# Patient Record
Sex: MALE | Race: WHITE | NOT HISPANIC OR LATINO | Employment: OTHER | URBAN - METROPOLITAN AREA
[De-identification: names, ages, dates, MRNs, and addresses within clinical notes are randomized per-mention and may not be internally consistent; named-entity substitution may affect disease eponyms.]

---

## 2020-11-30 NOTE — PROGRESS NOTES
"Subjective:       Patient ID: Ozzy Henson is a 70 y.o. male.    Chief Complaint:  Has a history of having nasal and eye allergies since childhood. Had been on SCITfor 30 plus years with excellent results. SCIT WAS DICONTINUED IN MAY 2020.    Used to get recurrent sinusitis nd bronchitis. Those are much improved after getting two PPSV 23 and two PCV 13 vaccines.        HPI :     Has been having bilateral parotitis, which is stable. Has PND and dry mouth.  Years ago had DVT left calf with history of pulmonary embolism and RML collapse.  Had been following up with PCP MD REGINALD Barcenas MD and pulmonologist Dr. Hollis Case. ' recent angiogram was normal "  Has right hip, knee , and legs pain and cramps'.      Outpatient Medications Marked as Taking for the 12/2/20 encounter (Office Visit) with Lenin Goetz MD   Medication Sig Dispense Refill    amLODIPine (NORVASC) 5 MG tablet       ascorbic acid, vitamin C, (VITAMIN C) 1000 MG tablet Take 1,000 mg by mouth once daily.      aspirin 325 MG tablet Take 325 mg by mouth once daily.      atorvastatin (LIPITOR) 20 MG tablet       fluticasone/vilanterol (BREO ELLIPTA INHL) Inhale into the lungs once as needed.      losartan (COZAAR) 100 MG tablet       pantoprazole (PROTONIX) 40 MG tablet       tamsulosin (FLOMAX) 0.4 mg Cap       vitamin D (VITAMIN D3) 1000 units Tab Take 1,000 Units by mouth once daily.          Patient has no known allergies.     History reviewed. No pertinent past medical history.    Family History   Problem Relation Age of Onset    Allergies Sister        Environmental History: Dust Mite Controls: Dust mite controls are already in place.   The patient is well versed with environmental control measures    Review of Systems   Constitutional: Negative.  Negative for fatigue and fever.   HENT: Positive for congestion. Negative for ear pain, postnasal drip, rhinorrhea, sinus pressure, sinus pain, sneezing and sore throat.    Eyes: " "Negative.  Negative for redness and itching.   Respiratory: Negative.  Negative for cough, chest tightness, shortness of breath and wheezing.    Cardiovascular: Negative.  Negative for chest pain.   Gastrointestinal: Negative.  Negative for nausea.   Endocrine: Negative.  Negative for cold intolerance.   Genitourinary: Negative.  Negative for frequency.   Musculoskeletal: Positive for joint swelling. Negative for myalgias.        Right hip, knee and calf pain and severe cramps right leg and thigh   Skin: Negative for rash.   Allergic/Immunologic: Negative.  Negative for environmental allergies, food allergies and immunocompromised state.   Neurological: Negative.  Negative for dizziness and headaches.   Hematological: Negative.  Negative for adenopathy.   Psychiatric/Behavioral: Negative.  Negative for sleep disturbance.       Objective:     Visit Vitals  /86   Pulse 79   Temp 97.9 °F (36.6 °C)   Ht 5' 9" (1.753 m)   Wt 97.3 kg (214 lb 8.1 oz)   BMI 31.68 kg/m²       Physical Exam  Constitutional:       Appearance: Normal appearance. He is normal weight.   HENT:      Head: Normocephalic and atraumatic.      Right Ear: Tympanic membrane and external ear normal.      Left Ear: Tympanic membrane, ear canal and external ear normal.      Nose: Congestion present.      Mouth/Throat:      Mouth: Mucous membranes are moist.      Pharynx: Oropharynx is clear.   Eyes:      Extraocular Movements: Extraocular movements intact.      Conjunctiva/sclera: Conjunctivae normal.      Pupils: Pupils are equal, round, and reactive to light.   Neck:      Musculoskeletal: Neck supple.   Cardiovascular:      Rate and Rhythm: Normal rate and regular rhythm.      Heart sounds: Normal heart sounds.   Pulmonary:      Effort: Pulmonary effort is normal.      Breath sounds: Normal breath sounds.   Abdominal:      General: Abdomen is flat. Bowel sounds are normal.      Palpations: Abdomen is soft.   Musculoskeletal: Normal range of motion. "   Skin:     General: Skin is warm and dry.      Capillary Refill: Capillary refill takes more than 3 seconds.   Neurological:      General: No focal deficit present.      Mental Status: He is alert and oriented to person, place, and time. Mental status is at baseline.   Psychiatric:         Mood and Affect: Mood normal.         Behavior: Behavior normal.         Thought Content: Thought content normal.         Judgment: Judgment normal.           Assessment:      1. Sleep apnea with use of continuous positive airway pressure (CPAP)    2. Recurrent sinusitis    3. Non-seasonal allergic rhinitis due to pollen    4. Bronchitis    5. Chronic parotitis    6. Gastroesophageal reflux disease without esophagitis     7      History of left calf deep vein thrombosis with PE and collapse of the  RML in the past  8       Been on SCIT for 30 plus years, which was discontinued in May 2020  Plan:    TREAT ALL INFECTIONS. HAD RECEIVED PPSV-23 ( 10. 05.2006 and in October 2020 from Dr. Barcenas ) ) and second PCV 13  ( fall of 2018 ) vaccines.  Had been on SCIT over 30 plus years, off and on, with excellent results. Stopped SCIT in May 2020.  Re emphasized environmental control measures.  Did not recommend satrting back on SCIT  Zyrtec 10 mg qd  Flonase 50 mcg 2 sprays per nares qd.  CPAP for CAITLYN  Protonix 40 mg half hour before supper.  Had two doses of Shingrix.  Annual influenza vaccination, 8341-6365.  Follow up in six months.  dID NOT RE START ON scit  Breo Ellipta 100\ 25 one puff daily to treat bronchitis.  Take CORONAVIRUS VACCINE IN 4105-1471.  Follow up in July 2021

## 2020-12-02 ENCOUNTER — OFFICE VISIT (OUTPATIENT)
Dept: ALLERGY | Facility: CLINIC | Age: 70
End: 2020-12-02
Payer: MEDICARE

## 2020-12-02 VITALS
HEART RATE: 79 BPM | TEMPERATURE: 98 F | BODY MASS INDEX: 31.77 KG/M2 | DIASTOLIC BLOOD PRESSURE: 86 MMHG | HEIGHT: 69 IN | SYSTOLIC BLOOD PRESSURE: 137 MMHG | WEIGHT: 214.5 LBS

## 2020-12-02 DIAGNOSIS — K11.23 CHRONIC PAROTITIS: ICD-10-CM

## 2020-12-02 DIAGNOSIS — K21.9 GASTROESOPHAGEAL REFLUX DISEASE WITHOUT ESOPHAGITIS: ICD-10-CM

## 2020-12-02 DIAGNOSIS — G47.30 SLEEP APNEA WITH USE OF CONTINUOUS POSITIVE AIRWAY PRESSURE (CPAP): Primary | ICD-10-CM

## 2020-12-02 DIAGNOSIS — J40 BRONCHITIS: ICD-10-CM

## 2020-12-02 DIAGNOSIS — J30.1 NON-SEASONAL ALLERGIC RHINITIS DUE TO POLLEN: ICD-10-CM

## 2020-12-02 DIAGNOSIS — J32.9 RECURRENT SINUSITIS: ICD-10-CM

## 2020-12-02 PROCEDURE — 99999 PR PBB SHADOW E&M-NEW PATIENT-LVL III: ICD-10-PCS | Mod: PBBFAC,,, | Performed by: SPECIALIST

## 2020-12-02 PROCEDURE — 99999 PR PBB SHADOW E&M-NEW PATIENT-LVL III: CPT | Mod: PBBFAC,,, | Performed by: SPECIALIST

## 2020-12-02 PROCEDURE — 99213 OFFICE O/P EST LOW 20 MIN: CPT | Mod: S$PBB,,, | Performed by: SPECIALIST

## 2020-12-02 PROCEDURE — 99213 PR OFFICE/OUTPT VISIT, EST, LEVL III, 20-29 MIN: ICD-10-PCS | Mod: S$PBB,,, | Performed by: SPECIALIST

## 2020-12-02 PROCEDURE — 99203 OFFICE O/P NEW LOW 30 MIN: CPT | Mod: PBBFAC | Performed by: SPECIALIST

## 2020-12-02 RX ORDER — ASPIRIN 325 MG
325 TABLET ORAL DAILY
COMMUNITY

## 2020-12-02 RX ORDER — ATORVASTATIN CALCIUM 20 MG/1
TABLET, FILM COATED ORAL
COMMUNITY
Start: 2020-12-01

## 2020-12-02 RX ORDER — CHOLECALCIFEROL (VITAMIN D3) 25 MCG
1000 TABLET ORAL DAILY
COMMUNITY

## 2020-12-02 RX ORDER — LOSARTAN POTASSIUM 100 MG/1
TABLET ORAL
COMMUNITY
Start: 2020-12-01

## 2020-12-02 RX ORDER — AMLODIPINE BESYLATE 5 MG/1
TABLET ORAL
COMMUNITY
Start: 2020-12-01

## 2020-12-02 RX ORDER — TAMSULOSIN HYDROCHLORIDE 0.4 MG/1
CAPSULE ORAL
COMMUNITY
Start: 2020-12-01

## 2020-12-02 RX ORDER — IBUPROFEN 100 MG/5ML
1000 SUSPENSION, ORAL (FINAL DOSE FORM) ORAL DAILY
COMMUNITY

## 2020-12-02 RX ORDER — PANTOPRAZOLE SODIUM 40 MG/1
TABLET, DELAYED RELEASE ORAL
COMMUNITY
Start: 2020-12-01

## 2021-01-20 ENCOUNTER — IMMUNIZATION (OUTPATIENT)
Dept: INTERNAL MEDICINE | Facility: CLINIC | Age: 71
End: 2021-01-20
Payer: COMMERCIAL

## 2021-01-20 DIAGNOSIS — Z23 NEED FOR VACCINATION: Primary | ICD-10-CM

## 2021-01-20 PROCEDURE — 91300 COVID-19, MRNA, LNP-S, PF, 30 MCG/0.3 ML DOSE VACCINE: CPT | Mod: PBBFAC | Performed by: FAMILY MEDICINE

## 2021-02-10 ENCOUNTER — IMMUNIZATION (OUTPATIENT)
Dept: INTERNAL MEDICINE | Facility: CLINIC | Age: 71
End: 2021-02-10
Payer: COMMERCIAL

## 2021-02-10 DIAGNOSIS — Z23 NEED FOR VACCINATION: Primary | ICD-10-CM

## 2021-02-10 PROCEDURE — 0002A COVID-19, MRNA, LNP-S, PF, 30 MCG/0.3 ML DOSE VACCINE: CPT | Mod: PBBFAC | Performed by: FAMILY MEDICINE

## 2021-02-10 PROCEDURE — 91300 COVID-19, MRNA, LNP-S, PF, 30 MCG/0.3 ML DOSE VACCINE: CPT | Mod: PBBFAC | Performed by: FAMILY MEDICINE

## 2021-07-08 ENCOUNTER — OFFICE VISIT (OUTPATIENT)
Dept: ALLERGY | Facility: CLINIC | Age: 71
End: 2021-07-08
Payer: MEDICARE

## 2021-07-08 VITALS
DIASTOLIC BLOOD PRESSURE: 76 MMHG | WEIGHT: 209.19 LBS | BODY MASS INDEX: 30.98 KG/M2 | HEART RATE: 85 BPM | TEMPERATURE: 98 F | SYSTOLIC BLOOD PRESSURE: 120 MMHG | HEIGHT: 69 IN

## 2021-07-08 DIAGNOSIS — J40 BRONCHITIS: ICD-10-CM

## 2021-07-08 DIAGNOSIS — G47.33 OSA (OBSTRUCTIVE SLEEP APNEA): ICD-10-CM

## 2021-07-08 DIAGNOSIS — J30.89 PERENNIAL ALLERGIC RHINITIS: ICD-10-CM

## 2021-07-08 DIAGNOSIS — K21.9 GASTROESOPHAGEAL REFLUX DISEASE WITHOUT ESOPHAGITIS: ICD-10-CM

## 2021-07-08 DIAGNOSIS — K11.23 CHRONIC PAROTITIS: ICD-10-CM

## 2021-07-08 DIAGNOSIS — J32.9 RECURRENT SINUSITIS: Primary | ICD-10-CM

## 2021-07-08 PROCEDURE — 99214 PR OFFICE/OUTPT VISIT, EST, LEVL IV, 30-39 MIN: ICD-10-PCS | Mod: S$PBB,,, | Performed by: SPECIALIST

## 2021-07-08 PROCEDURE — 99213 OFFICE O/P EST LOW 20 MIN: CPT | Mod: PBBFAC | Performed by: SPECIALIST

## 2021-07-08 PROCEDURE — 99999 PR PBB SHADOW E&M-EST. PATIENT-LVL III: ICD-10-PCS | Mod: PBBFAC,,, | Performed by: SPECIALIST

## 2021-07-08 PROCEDURE — 99214 OFFICE O/P EST MOD 30 MIN: CPT | Mod: S$PBB,,, | Performed by: SPECIALIST

## 2021-07-08 PROCEDURE — 99999 PR PBB SHADOW E&M-EST. PATIENT-LVL III: CPT | Mod: PBBFAC,,, | Performed by: SPECIALIST

## 2021-07-08 RX ORDER — MONTELUKAST SODIUM 10 MG/1
10 TABLET ORAL NIGHTLY
Qty: 30 TABLET | Refills: 6 | Status: SHIPPED | OUTPATIENT
Start: 2021-07-08 | End: 2021-08-07

## 2021-07-08 RX ORDER — AZELASTINE 1 MG/ML
2 SPRAY, METERED NASAL 2 TIMES DAILY
Qty: 30 ML | Refills: 6 | Status: SHIPPED | OUTPATIENT
Start: 2021-07-08 | End: 2021-08-07

## 2022-01-14 ENCOUNTER — TELEPHONE (OUTPATIENT)
Dept: ALLERGY | Facility: CLINIC | Age: 72
End: 2022-01-14
Payer: COMMERCIAL

## 2022-01-14 NOTE — TELEPHONE ENCOUNTER
----- Message from Diana Arevalo sent at 1/14/2022 10:09 AM CST -----  Contact: Ozzy  Patient is calling to speak with the nurse regarding rescheduling the 1/26/22 appointment. Reports having a major surgery and is requesting a call back when possible. Pleas give patient a call back at [phone] as requested.  Thanks,  RP

## 2022-03-03 ENCOUNTER — OFFICE VISIT (OUTPATIENT)
Dept: ALLERGY | Facility: CLINIC | Age: 72
End: 2022-03-03
Payer: MEDICARE

## 2022-03-03 VITALS
HEIGHT: 69 IN | TEMPERATURE: 98 F | DIASTOLIC BLOOD PRESSURE: 87 MMHG | BODY MASS INDEX: 30.1 KG/M2 | SYSTOLIC BLOOD PRESSURE: 134 MMHG | WEIGHT: 203.25 LBS | HEART RATE: 89 BPM

## 2022-03-03 DIAGNOSIS — J30.89 PERENNIAL ALLERGIC RHINITIS: ICD-10-CM

## 2022-03-03 DIAGNOSIS — J32.9 RECURRENT SINUSITIS: Primary | ICD-10-CM

## 2022-03-03 DIAGNOSIS — K21.9 GASTROESOPHAGEAL REFLUX DISEASE WITHOUT ESOPHAGITIS: ICD-10-CM

## 2022-03-03 DIAGNOSIS — J30.2 PERENNIAL ALLERGIC RHINITIS WITH SEASONAL VARIATION: ICD-10-CM

## 2022-03-03 DIAGNOSIS — G47.33 OSA (OBSTRUCTIVE SLEEP APNEA): ICD-10-CM

## 2022-03-03 DIAGNOSIS — K11.7 XEROSTOMIA: ICD-10-CM

## 2022-03-03 DIAGNOSIS — K11.23 CHRONIC PAROTITIS: ICD-10-CM

## 2022-03-03 DIAGNOSIS — J40 BRONCHITIS: ICD-10-CM

## 2022-03-03 DIAGNOSIS — J30.89 PERENNIAL ALLERGIC RHINITIS WITH SEASONAL VARIATION: ICD-10-CM

## 2022-03-03 PROCEDURE — 99999 PR PBB SHADOW E&M-EST. PATIENT-LVL III: ICD-10-PCS | Mod: PBBFAC,,, | Performed by: SPECIALIST

## 2022-03-03 PROCEDURE — 99214 OFFICE O/P EST MOD 30 MIN: CPT | Mod: S$PBB,,, | Performed by: SPECIALIST

## 2022-03-03 PROCEDURE — 99999 PR PBB SHADOW E&M-EST. PATIENT-LVL III: CPT | Mod: PBBFAC,,, | Performed by: SPECIALIST

## 2022-03-03 PROCEDURE — 99214 PR OFFICE/OUTPT VISIT, EST, LEVL IV, 30-39 MIN: ICD-10-PCS | Mod: S$PBB,,, | Performed by: SPECIALIST

## 2022-03-03 PROCEDURE — 99213 OFFICE O/P EST LOW 20 MIN: CPT | Mod: PBBFAC | Performed by: SPECIALIST

## 2022-11-05 NOTE — PROGRESS NOTES
Subjective:       Patient ID: Ozzy Henson is a 72 y.o. male.    Chief Complaint:    FOLLOW UP ON RECURRENT INFECTIONS, PERENNIAL ALLERGIC RHINITIS AND GERD.    HPI:     male, has been a long time patient of mine-- for over 3 decades. He has a history of recurrent sinusitis and bronchitis-- He underwent immune evaluation and was immunized with multiple PPSV 23 and PCV-1 3 WITH GREAT BENEFIT. HE HAS BEEN GETTING MUCH LESS INFECTIONS.In the fall of 2018, he had last PCV- 13 from Dr Vahid Barcenas . Vaccination response was not available. Getting less infections.    He had had year round nasal and eye allergies. Allergy evaluation was done on multiple occasions. OFF and on he underwent AIT / SCIT  from 1990 till May 2020.   He was very much benefited by AIT .  He is well versed with allergen avoidance measures.  Currently on Flonase, Azelastine and antihistamine, working well.  Has xerostomia, chronic parotitis and xerophthalmia.    Has history of left calf DVT and RML PE. Cardiologist Hollis Case MD, PCP Vahid Barcenas MD, Urologists Jose Ramon Busch MD / Eduardo Lima MD have been following him.  In mid 2020 had prostatectomy for stage 1 - c prostate cancer with seminal vesicles removal and lymph nodes dissection.  GERD is treated with protonix.  Has CAITLYN , on CPAP.  Never smoked cigarettes or vaped. Retired optometrist. No ongoing allergens or irritants exposure.           Patient has no known allergies.     Past Medical History:   Diagnosis Date    Perennial allergic rhinitis 7/8/2021       Family History   Problem Relation Age of Onset    Allergies Sister        Environmental History: Dust Mite Controls: Dust mite controls are already in place.     Review of Systems   Constitutional:  Positive for fatigue. Negative for fever.   HENT:  Positive for congestion, postnasal drip and rhinorrhea. Negative for ear pain, sinus pressure, sinus pain, sneezing and sore throat.    Eyes:  Positive for itching.  Negative for redness.   Respiratory: Negative.  Negative for cough, chest tightness, shortness of breath and wheezing.    Cardiovascular: Negative.  Negative for chest pain.   Gastrointestinal: Negative.  Negative for nausea.   Endocrine: Negative.  Negative for cold intolerance.   Genitourinary: Negative.  Negative for frequency.   Musculoskeletal: Negative.  Negative for myalgias.   Skin:  Negative for rash.   Allergic/Immunologic: Positive for environmental allergies. Negative for food allergies and immunocompromised state.   Neurological: Negative.  Negative for dizziness and headaches.   Hematological: Negative.  Negative for adenopathy.   Psychiatric/Behavioral: Negative.  Negative for sleep disturbance.      Objective:     There were no vitals taken for this visit.    Physical Exam  Vitals and nursing note reviewed.   Constitutional:       Appearance: Normal appearance. He is normal weight.   HENT:      Head: Normocephalic and atraumatic.      Right Ear: Tympanic membrane, ear canal and external ear normal.      Left Ear: Tympanic membrane, ear canal and external ear normal.      Nose: Congestion and rhinorrhea present.      Mouth/Throat:      Mouth: Mucous membranes are moist.      Pharynx: Oropharynx is clear.   Eyes:      Extraocular Movements: Extraocular movements intact.      Conjunctiva/sclera: Conjunctivae normal.      Pupils: Pupils are equal, round, and reactive to light.   Cardiovascular:      Rate and Rhythm: Normal rate and regular rhythm.      Pulses: Normal pulses.      Heart sounds: Normal heart sounds.   Pulmonary:      Effort: Pulmonary effort is normal.      Breath sounds: Normal breath sounds.   Abdominal:      General: Abdomen is flat. Bowel sounds are normal.      Palpations: Abdomen is soft.   Musculoskeletal:         General: Normal range of motion.      Cervical back: Normal range of motion and neck supple.   Skin:     General: Skin is warm and dry.      Capillary Refill: Capillary  refill takes less than 2 seconds.   Neurological:      General: No focal deficit present.      Mental Status: He is alert and oriented to person, place, and time. Mental status is at baseline.   Psychiatric:         Mood and Affect: Mood normal.         Behavior: Behavior normal.         Thought Content: Thought content normal.         Judgment: Judgment normal.       Assessment:      1. Recurrent sinusitis    2. Gastroesophageal reflux disease without esophagitis    3. Perennial allergic conjunctivitis of both eyes    4. Bronchitis    5. Xerostomia    6. CAITLYN on CPAP    7. Chronic parotitis        Plan:     No longer on AIT / SCIT.  Re emphasized environmental control measures  Had 3---  PPSV- 23 between 2004 and August 19 2020 and a PCV- 13 at Dr Barcenas's office- Fall of 2018-   Consider PCV- 20 booster.  Up to date on adult immunizations  CAITLYN -- on CPAP.  FLONASE 50 Mcg plus Azelastine 137 mcg-- qd or bid  Singulair 10 mg prn  Zyrtec 10 mg qd   Protonix 40 mg-- half hour before supper  Had 3 doses of COVID vaccines.  Annual influenza vaccinations  Follow up in Nov 2023                Problems Address                                                 Amount and/or Complexity                                                                      Risk       3           [] 2 or more self-limited or minor problems                      [] Limited                                                                        [] Low                  [] 1 stable chronic illness                                                  Any combination of the two                                               OTC drugs                  []Acute, uncomplicated illness or injury                            Review of prior external notes from unique source           Minor surgery with no risk factors                                                                                                               [] 1 []2  []3+                                                                                                               Review of results from each unique test                                                                                                               [] 1 []2  [] 3+                                                                                                              Order of each unique test                                                                                                               [] 1 []2  [] 3+                                                                                                              Or                                                                                                             [] Assessment requiring an independent historian      4            [x] One or more chronic illness with exacerbation,              [x] Moderate                                                                      [x] Moderate                 Progression, or side effects of treatment                            -test documents or independent historians                        Prescription drug management                [x]  2 or more sable chronic illnesses                                    [] Independent interpretation of tests                              Minor surgery with identifiable risk                [] 1 undiagnosed new problem with uncertain prognosis    [] Discussion or management of test results                    elective major surgery                [] 1 acute illness with                systemic symptoms                                                                                                                                                              [] 1 acute complicated injury                                                                                                                                          Elective major surgery                                                                                                                                                                                                                                                                                                                                                                                                   5            [] 1 or more chronic illnesses with severe exacerbation,     [] Extensive(two from below)                                         [] High                                                                                                               [] Independent interpretation of results                         Drug therapy requiring intensive                                                                                                               []Discussion of management or test interpretation           monitoring                                                                                                                                                                                                       Decision to de-escalate care                 [] 1 acute or chronic illness or injury that poses a threat                                                                                               Decision regarding hospitalization

## 2022-11-09 ENCOUNTER — OFFICE VISIT (OUTPATIENT)
Dept: ALLERGY | Facility: CLINIC | Age: 72
End: 2022-11-09
Payer: MEDICARE

## 2022-11-09 VITALS
BODY MASS INDEX: 28.88 KG/M2 | DIASTOLIC BLOOD PRESSURE: 82 MMHG | TEMPERATURE: 98 F | WEIGHT: 195.56 LBS | SYSTOLIC BLOOD PRESSURE: 127 MMHG | HEART RATE: 83 BPM

## 2022-11-09 DIAGNOSIS — J40 BRONCHITIS: ICD-10-CM

## 2022-11-09 DIAGNOSIS — K11.23 CHRONIC PAROTITIS: ICD-10-CM

## 2022-11-09 DIAGNOSIS — K11.7 XEROSTOMIA: ICD-10-CM

## 2022-11-09 DIAGNOSIS — K21.9 GASTROESOPHAGEAL REFLUX DISEASE WITHOUT ESOPHAGITIS: ICD-10-CM

## 2022-11-09 DIAGNOSIS — G47.33 OSA ON CPAP: ICD-10-CM

## 2022-11-09 DIAGNOSIS — J32.9 RECURRENT SINUSITIS: Primary | ICD-10-CM

## 2022-11-09 DIAGNOSIS — H10.13 PERENNIAL ALLERGIC CONJUNCTIVITIS OF BOTH EYES: ICD-10-CM

## 2022-11-09 PROCEDURE — 99214 OFFICE O/P EST MOD 30 MIN: CPT | Mod: S$PBB,,, | Performed by: SPECIALIST

## 2022-11-09 PROCEDURE — 99999 PR PBB SHADOW E&M-EST. PATIENT-LVL III: ICD-10-PCS | Mod: PBBFAC,,, | Performed by: SPECIALIST

## 2022-11-09 PROCEDURE — 99999 PR PBB SHADOW E&M-EST. PATIENT-LVL III: CPT | Mod: PBBFAC,,, | Performed by: SPECIALIST

## 2022-11-09 PROCEDURE — 99213 OFFICE O/P EST LOW 20 MIN: CPT | Mod: PBBFAC | Performed by: SPECIALIST

## 2022-11-09 PROCEDURE — 99214 PR OFFICE/OUTPT VISIT, EST, LEVL IV, 30-39 MIN: ICD-10-PCS | Mod: S$PBB,,, | Performed by: SPECIALIST

## 2023-11-28 PROBLEM — E50.7 XEROPHTHALMIA: Status: ACTIVE | Noted: 2023-11-28

## 2023-11-28 NOTE — PROGRESS NOTES
Subjective:       Patient ID: Ozzy Henson is a 73 y.o. male.    Chief Complaint:   FOLLOW UP ON RECURRENT SINUSITIS, BRONCHITIS, PERENNIAL ALLERGIC RHINITIS, MALAISE AND FATIGUE, GERD AND CAITLYN     HPI:     male 73 YEAR OLD WITH THE ABOVE COMPLAINTS- FOR FOLLOW UP VISIT. OVERALL DOING WELL , ALSO UNDER THE CARE OF  ANAMIKA BARAHONA AND HIS CARDIOLOGIST AND OTHER PHYSICIANS.  I have been treating him for allergies and recurrent sinusitis and bronchitis for the past 3 decades. He had been evaluated for allergies , on multiple occasions , with SPTs and had immune evaluation  for recurrent infections on multiple different occasions. He was immunized with PCV- 13 and PPSV- 23. He would benefit from Prevnar- 20. Vaccine and AREXVY. Fully immunized with COVID vaccine.  Amari  with Pneumovax- 23 and Prevnar- 13 in the past with great benefit. He had been on allergen immunotherapy / SCIT for 3 decades off  and on. . SCIT was stopped in the latter part of 2020 - after the [place] of COVID / NOVEL CORONAVIRUS PANDEMIC..  HE HAS CHRONIC PAROTITIS, XEROPHTHALMIA AND XEROSTOMIA..    HE HAS A HISTORY OF DVT- RML and left calf- Will regularly follow up with his PCP, Cardiologist Hollis Case MD, Urologists Jose Ramon Busch MD and Eduardo Lima MD  He is on CPAP for CAITLYN. GERD is treated by anti reflux measures and a PPI.  HE IS A RETIRED OPTOMETRIST. NEVER VAPED or smoked cigarettes, No ongoing allergens or irritants exposure  FOR STAGE 1- C prostate cancer, he had prostatectomy and removal of seminal vesicles and lymph nodes dissection  Regularly exercising and doing stretching and going to the Health club to strengthen his muscles.          Patient has no known allergies.     Past Medical History:   Diagnosis Date    Perennial allergic rhinitis 7/8/2021       Family History   Problem Relation Age of Onset    Allergies Sister        Environmental History: Dust Mite Controls: Dust mite controls are already in place.      Review of Systems   Constitutional:  Positive for fatigue.   HENT:  Positive for postnasal drip. Negative for congestion.    Eyes: Negative.  Negative for itching.   Respiratory: Negative.     Cardiovascular: Negative.    Gastrointestinal: Negative.    Endocrine: Negative.    Genitourinary: Negative.    Musculoskeletal:  Positive for gait problem.   Skin: Negative.    Allergic/Immunologic: Positive for environmental allergies.   Hematological: Negative.    Psychiatric/Behavioral: Negative.       Objective:     There were no vitals taken for this visit.    Physical Exam  Vitals and nursing note reviewed.   Constitutional:       Appearance: Normal appearance. He is normal weight.   HENT:      Head: Normocephalic and atraumatic.      Right Ear: Tympanic membrane, ear canal and external ear normal.      Left Ear: Tympanic membrane, ear canal and external ear normal.      Nose: Congestion and rhinorrhea present.      Mouth/Throat:      Mouth: Mucous membranes are moist.      Pharynx: Oropharynx is clear.   Eyes:      Extraocular Movements: Extraocular movements intact.      Conjunctiva/sclera: Conjunctivae normal.      Pupils: Pupils are equal, round, and reactive to light.   Cardiovascular:      Rate and Rhythm: Normal rate and regular rhythm.      Pulses: Normal pulses.      Heart sounds: Normal heart sounds.   Pulmonary:      Effort: Pulmonary effort is normal.      Breath sounds: Normal breath sounds.   Abdominal:      General: Abdomen is flat. Bowel sounds are normal.      Palpations: Abdomen is soft.   Musculoskeletal:         General: Normal range of motion.      Cervical back: Normal range of motion and neck supple.   Skin:     General: Skin is warm and dry.      Capillary Refill: Capillary refill takes less than 2 seconds.   Neurological:      General: No focal deficit present.      Mental Status: He is alert and oriented to person, place, and time. Mental status is at baseline.   Psychiatric:         Mood  and Affect: Mood normal.         Behavior: Behavior normal.         Thought Content: Thought content normal.         Judgment: Judgment normal.       Assessment:      1. Recurrent sinusitis    2. Perennial allergic rhinitis with seasonal variation    3. Gastroesophageal reflux disease without esophagitis    4. CAITLYN (obstructive sleep apnea)    5. Bronchitis    6. Xerostomia    7. Chronic parotitis    8. Xerophthalmia    9       After COVID in November 2022- after a visit to [place]-- had losing muscle mass treated by PT/ OT, stretching and exercises at the Gym.            Now stable.  Has balance issues- Extensive work up by Dr Barcenas were normal. Changed Lipitor to Zetia.  10     Had  6 falls in 5 months - none in the last 6 months- extremely careful with walking- loses balance easily and falls forward    Plan:     HAD 5 doses of COVID vaccines and booster.  May get PCV- 20- from Vahid Barcenas MD.  Hd 3  pneumococcal vaccines- Pneumovax or Prevnar- 13 - 2004 , 2017 and August 19, 2020-  AREXVY vaccine.- will get in January 2024  Annual influenza vaccinations.  CPAP for CAITLYN  Protonix 40 MG before supper.  ZYRTEC 10 mg  Singulair 10 mg qd.  Azelastine 137  mcg and Flonase 50 mcg qd or bid.  Follow up in November 2024                  Problems Address                                                 Amount and/or Complexity                                                                      Risk       3           [] 2 or more self-limited or minor problems                      [] Limited                                                                        [] Low                  [] 1 stable chronic illness                                                  Any combination of the two                                               OTC drugs                  []Acute, uncomplicated illness or injury                            Review of prior external notes from unique source           Minor surgery with no risk factors                                                                                                                [] 1 []2  []3+                                                                                                              Review of results from each unique test                                                                                                               [] 1 []2  [] 3+                                                                                                              Order of each unique test                                                                                                               [] 1 []2  [] 3+                                                                                                              Or                                                                                                             [] Assessment requiring an independent historian      4            [] One or more chronic illness with exacerbation,              [] Moderate                                                                      [] Moderate                 Progression, or side effects of treatment                            -test documents or independent historians                        Prescription drug management                []  2 or more sable chronic illnesses                                    [] Independent interpretation of tests                              Minor surgery with identifiable risk                [] 1 undiagnosed new problem with uncertain prognosis    [] Discussion or management of test results                    elective major surgery                [] 1 acute illness with                systemic symptoms                                                                                                                                                              [] 1 acute complicated injury                                                                                                                                           Elective major surgery                                                                                                                                                                                                                                                                                                                                                                                                  5            [x] 1 or more chronic illnesses with severe exacerbation,     [x] Extensive(two from below)                                         [x] High                                                                                                               [] Independent interpretation of results                         Drug therapy requiring intensive                                                                                                               []Discussion of management or test interpretation           monitoring                                                                                                                                                                                                       Decision to de-escalate care                 [] 1 acute or chronic illness or injury that poses a threat                                                                                               Decision regarding hospitalization

## 2023-11-29 ENCOUNTER — OFFICE VISIT (OUTPATIENT)
Dept: ALLERGY | Facility: CLINIC | Age: 73
End: 2023-11-29
Payer: MEDICARE

## 2023-11-29 VITALS
WEIGHT: 205 LBS | SYSTOLIC BLOOD PRESSURE: 147 MMHG | DIASTOLIC BLOOD PRESSURE: 86 MMHG | TEMPERATURE: 98 F | BODY MASS INDEX: 30.28 KG/M2 | HEART RATE: 71 BPM

## 2023-11-29 DIAGNOSIS — G47.33 OSA (OBSTRUCTIVE SLEEP APNEA): ICD-10-CM

## 2023-11-29 DIAGNOSIS — K11.23 CHRONIC PAROTITIS: ICD-10-CM

## 2023-11-29 DIAGNOSIS — K21.9 GASTROESOPHAGEAL REFLUX DISEASE WITHOUT ESOPHAGITIS: ICD-10-CM

## 2023-11-29 DIAGNOSIS — J40 BRONCHITIS: ICD-10-CM

## 2023-11-29 DIAGNOSIS — J32.9 RECURRENT SINUSITIS: Primary | ICD-10-CM

## 2023-11-29 DIAGNOSIS — E50.7 XEROPHTHALMIA: ICD-10-CM

## 2023-11-29 DIAGNOSIS — K11.7 XEROSTOMIA: ICD-10-CM

## 2023-11-29 DIAGNOSIS — J30.89 PERENNIAL ALLERGIC RHINITIS WITH SEASONAL VARIATION: ICD-10-CM

## 2023-11-29 DIAGNOSIS — J30.2 PERENNIAL ALLERGIC RHINITIS WITH SEASONAL VARIATION: ICD-10-CM

## 2023-11-29 PROCEDURE — 99215 PR OFFICE/OUTPT VISIT, EST, LEVL V, 40-54 MIN: ICD-10-PCS | Mod: S$PBB,,, | Performed by: SPECIALIST

## 2023-11-29 PROCEDURE — 99215 OFFICE O/P EST HI 40 MIN: CPT | Mod: S$PBB,,, | Performed by: SPECIALIST

## 2023-11-29 PROCEDURE — 99999 PR PBB SHADOW E&M-EST. PATIENT-LVL III: ICD-10-PCS | Mod: PBBFAC,,, | Performed by: SPECIALIST

## 2023-11-29 PROCEDURE — 99213 OFFICE O/P EST LOW 20 MIN: CPT | Mod: PBBFAC | Performed by: SPECIALIST

## 2023-11-29 PROCEDURE — 99999 PR PBB SHADOW E&M-EST. PATIENT-LVL III: CPT | Mod: PBBFAC,,, | Performed by: SPECIALIST

## 2023-11-29 RX ORDER — EZETIMIBE 10 MG/1
10 TABLET ORAL DAILY
COMMUNITY

## 2023-11-29 RX ORDER — CYANOCOBALAMIN (VITAMIN B-12) 250 MCG
250 TABLET ORAL DAILY
COMMUNITY

## 2024-11-07 ENCOUNTER — OFFICE VISIT (OUTPATIENT)
Dept: ALLERGY | Facility: CLINIC | Age: 74
End: 2024-11-07
Payer: MEDICARE

## 2024-11-07 VITALS
SYSTOLIC BLOOD PRESSURE: 124 MMHG | HEART RATE: 78 BPM | BODY MASS INDEX: 30.18 KG/M2 | WEIGHT: 204.38 LBS | DIASTOLIC BLOOD PRESSURE: 73 MMHG | TEMPERATURE: 98 F

## 2024-11-07 DIAGNOSIS — R05.9 COUGH IN ADULT: ICD-10-CM

## 2024-11-07 DIAGNOSIS — R53.83 MALAISE AND FATIGUE: ICD-10-CM

## 2024-11-07 DIAGNOSIS — K11.23 CHRONIC PAROTITIS: ICD-10-CM

## 2024-11-07 DIAGNOSIS — J32.9 RECURRENT SINUSITIS: Primary | ICD-10-CM

## 2024-11-07 DIAGNOSIS — G47.33 OSA ON CPAP: ICD-10-CM

## 2024-11-07 DIAGNOSIS — R53.81 MALAISE AND FATIGUE: ICD-10-CM

## 2024-11-07 DIAGNOSIS — K21.9 GASTROESOPHAGEAL REFLUX DISEASE WITHOUT ESOPHAGITIS: ICD-10-CM

## 2024-11-07 DIAGNOSIS — J30.89 PERENNIAL ALLERGIC RHINITIS WITH SEASONAL VARIATION: ICD-10-CM

## 2024-11-07 DIAGNOSIS — J40 BRONCHITIS: ICD-10-CM

## 2024-11-07 DIAGNOSIS — J30.2 PERENNIAL ALLERGIC RHINITIS WITH SEASONAL VARIATION: ICD-10-CM

## 2024-11-07 DIAGNOSIS — K11.7 XEROSTOMIA: ICD-10-CM

## 2024-11-07 DIAGNOSIS — E50.7 XEROPHTHALMIA: ICD-10-CM

## 2024-11-07 PROCEDURE — 99215 OFFICE O/P EST HI 40 MIN: CPT | Mod: S$PBB,,, | Performed by: SPECIALIST

## 2024-11-07 PROCEDURE — 99213 OFFICE O/P EST LOW 20 MIN: CPT | Mod: PBBFAC | Performed by: SPECIALIST

## 2024-11-07 PROCEDURE — 99999 PR PBB SHADOW E&M-EST. PATIENT-LVL III: CPT | Mod: PBBFAC,,, | Performed by: SPECIALIST

## 2024-11-07 RX ORDER — BEMPEDOIC ACID AND EZETIMIBE 180; 10 MG/1; MG/1
TABLET, FILM COATED ORAL
COMMUNITY

## 2024-11-07 NOTE — PROGRESS NOTES
Subjective:       Patient ID: Ozzy Henson is a 74 y.o. male.    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  He has done well this year on the current thearpy under PCP Vahid Barcenas MD and cardiologist Kamron Celeste md.  Amari underwent allergy SPTs on multiple occasions-- and had been on AIT / SCIT , OFF AND ON FOR OVER 20 YEARS -- WITH GREAT SUCCESS.  He is well versed with environmental control measures. SCIT was stopped in mid 2020..  Has chronic bilateral parotitis, xerophthalmia and Xerostomia. However he does not have any auto immune disease - including Sjogren's Syndrome..  He has Specific antibody Deficiency- with normal immunoglobulins.   Amari was immunized with multiple PPSV- 23, PCV- 13 and Prevnar- 20 . He had AREXVY, AT LEAST 5 doses of COVID vaccine and influenza vaccine.  He never vaped nicotine or smoked cigarettes.  He has CAITLYN and is on CPAP. He has severe and chronic GERD , treated with anti reflux measures and a PPI.  HE HAS A HISTORY OF DVT- RML and left calf.  For stage 1 prostate cancer- follow up with Eduardo Pacheco MD/ Jose Ramon Busch MD and Dr Vahid Barcenas MD- PCP                  Outpatient Medications Marked as Taking for the 11/7/24 encounter (Office Visit) with Lenin Goetz MD   Medication Sig Dispense Refill    amLODIPine (NORVASC) 5 MG tablet       ascorbic acid, vitamin C, (VITAMIN C) 1000 MG tablet Take 1,000 mg by mouth once daily.      aspirin 325 MG tablet Take 325 mg by mouth once daily.      bempedoic acid-ezetimibe (NEXLIZET) 180-10 mg Tab Take by mouth.      cyanocobalamin (VITAMIN B-12) 250 MCG tablet Take 250 mcg by mouth once daily.      fluticasone/vilanterol (BREO ELLIPTA INHL) Inhale into the lungs once as needed.      losartan (COZAAR) 100 MG tablet       pantoprazole  (PROTONIX) 40 MG tablet       tamsulosin (FLOMAX) 0.4 mg Cap       vitamin D (VITAMIN D3) 1000 units Tab Take 1,000 Units by mouth once daily.          Patient has no known allergies.     Past Medical History:   Diagnosis Date    Perennial allergic rhinitis 7/8/2021       Family History   Problem Relation Name Age of Onset    Allergies Sister         Environmental History: Dust Mite Controls: Dust mite controls are already in place.     Review of Systems   Constitutional: Negative.    HENT:  Positive for congestion and postnasal drip.    Eyes:  Positive for itching.   Respiratory:  Positive for cough.    Cardiovascular: Negative.    Gastrointestinal: Negative.    Endocrine: Negative.    Genitourinary: Negative.    Musculoskeletal: Negative.    Skin: Negative.    Allergic/Immunologic: Positive for environmental allergies.   Neurological: Negative.    Hematological: Negative.    Psychiatric/Behavioral: Negative.       Objective:     Visit Vitals  /73 (BP Location: Right arm, Patient Position: Sitting)   Pulse 78   Temp 98.4 °F (36.9 °C)   Wt 92.7 kg (204 lb 5.9 oz)   BMI 30.18 kg/m²       Physical Exam  Vitals and nursing note reviewed.   Constitutional:       Appearance: Normal appearance. He is normal weight.   HENT:      Head: Normocephalic and atraumatic.      Right Ear: Tympanic membrane, ear canal and external ear normal.      Left Ear: Ear canal and external ear normal.      Nose: Congestion present.      Mouth/Throat:      Mouth: Mucous membranes are dry.      Pharynx: Oropharynx is clear.   Eyes:      Extraocular Movements: Extraocular movements intact.      Conjunctiva/sclera: Conjunctivae normal.      Pupils: Pupils are equal, round, and reactive to light.   Cardiovascular:      Rate and Rhythm: Normal rate and regular rhythm.      Pulses: Normal pulses.      Heart sounds: Normal heart sounds.   Pulmonary:      Effort: Pulmonary effort is normal.   Abdominal:      General: Abdomen is flat. Bowel  sounds are normal.      Palpations: Abdomen is soft.   Musculoskeletal:         General: Normal range of motion.      Cervical back: Normal range of motion and neck supple.   Skin:     General: Skin is warm and dry.   Neurological:      General: No focal deficit present.      Mental Status: He is alert and oriented to person, place, and time. Mental status is at baseline.   Psychiatric:         Mood and Affect: Mood normal.         Behavior: Behavior normal.         Thought Content: Thought content normal.         Judgment: Judgment normal.       Assessment:      1. Recurrent sinusitis    2. Perennial allergic rhinitis with seasonal variation    3. Gastroesophageal reflux disease without esophagitis    4. Bronchitis    5. Xerophthalmia    6. Malaise and fatigue    7. CAITLYN on CPAP    8. Xerostomia    9. Cough in adult    10. Chronic parotitis        Plan:     Up to date on adult immunizations.  Recommend Twinrix- Hepatitis A AND B-- 3 doses- now, at 6 weeks and 6 months.  Annual influenza vaccine.  Had 5 COVID vaccine doses.  Had PREVNAR - 20 January 2024-   Had Prevnar- 13- 2004 and 2017 and August 2020-  Had 3 Poneumovax- 23.  Had AREXVY vaccine  Protonix 40 mg before supper.  Azelastine 137 mcg plus Flonase qd or bid.  Annual influenza vaccines.  PRN ZYRTEC 10 mg  Singulair 10 mg.  Follow up once a year.                Problems Address                                                 Amount and/or Complexity                                                                      Risk       3           [] 2 or more self-limited or minor problems                      [] Limited                                                                        [] Low                  [] 1 stable chronic illness                                                  Any combination of the two                                               OTC drugs                  []Acute, uncomplicated illness or injury                            Review of  prior external notes from unique source           Minor surgery with no risk factors                                                                                                               [] 1 []2  []3+                                                                                                              Review of results from each unique test                                                                                                               [] 1 []2  [] 3+                                                                                                              Order of each unique test                                                                                                               [] 1 []2  [] 3+                                                                                                              Or                                                                                                             [] Assessment requiring an independent historian      4            [] One or more chronic illness with exacerbation,              [] Moderate                                                                      [] Moderate                 Progression, or side effects of treatment                            -test documents or independent historians                        Prescription drug management                []  2 or more sable chronic illnesses                                    [] Independent interpretation of tests                              Minor surgery with identifiable risk                [] 1 undiagnosed new problem with uncertain prognosis    [] Discussion or management of test results                    elective major surgery                [] 1 acute illness with                systemic symptoms                                                                                                                                                               [] 1 acute complicated injury                                                                                                                                          Elective major surgery                                                                                                                                                                                                                                                                                                                                                                                                  5            [x] 1 or more chronic illnesses with severe exacerbation,     [x] Extensive(two from below)                                         [x] High                                                                                                               [] Independent interpretation of results                         Drug therapy requiring intensive                                                                                                               []Discussion of management or test interpretation           monitoring                                                                                                                                                                                                       Decision to de-escalate care                 [] 1 acute or chronic illness or injury that poses a threat                                                                                               Decision regarding hospitalization